# Patient Record
Sex: MALE | Race: WHITE | NOT HISPANIC OR LATINO | Employment: UNEMPLOYED | URBAN - METROPOLITAN AREA
[De-identification: names, ages, dates, MRNs, and addresses within clinical notes are randomized per-mention and may not be internally consistent; named-entity substitution may affect disease eponyms.]

---

## 2017-03-19 ENCOUNTER — APPOINTMENT (OUTPATIENT)
Dept: RADIOLOGY | Facility: MEDICAL CENTER | Age: 37
End: 2017-03-19
Attending: EMERGENCY MEDICINE

## 2017-03-19 ENCOUNTER — HOSPITAL ENCOUNTER (EMERGENCY)
Facility: MEDICAL CENTER | Age: 37
End: 2017-03-19
Attending: EMERGENCY MEDICINE

## 2017-03-19 VITALS
TEMPERATURE: 98.2 F | HEART RATE: 86 BPM | SYSTOLIC BLOOD PRESSURE: 128 MMHG | RESPIRATION RATE: 14 BRPM | HEIGHT: 70 IN | OXYGEN SATURATION: 97 % | WEIGHT: 162.26 LBS | DIASTOLIC BLOOD PRESSURE: 79 MMHG | BODY MASS INDEX: 23.23 KG/M2

## 2017-03-19 DIAGNOSIS — L02.11 NECK ABSCESS: ICD-10-CM

## 2017-03-19 LAB
ANION GAP SERPL CALC-SCNC: 8 MMOL/L (ref 0–11.9)
BASOPHILS # BLD AUTO: 0.3 % (ref 0–1.8)
BASOPHILS # BLD: 0.04 K/UL (ref 0–0.12)
BUN SERPL-MCNC: 16 MG/DL (ref 8–22)
CALCIUM SERPL-MCNC: 9.3 MG/DL (ref 8.5–10.5)
CHLORIDE SERPL-SCNC: 100 MMOL/L (ref 96–112)
CO2 SERPL-SCNC: 26 MMOL/L (ref 20–33)
CREAT SERPL-MCNC: 0.65 MG/DL (ref 0.5–1.4)
EOSINOPHIL # BLD AUTO: 0.25 K/UL (ref 0–0.51)
EOSINOPHIL NFR BLD: 2.1 % (ref 0–6.9)
ERYTHROCYTE [DISTWIDTH] IN BLOOD BY AUTOMATED COUNT: 37.9 FL (ref 35.9–50)
GFR SERPL CREATININE-BSD FRML MDRD: >60 ML/MIN/1.73 M 2
GLUCOSE SERPL-MCNC: 98 MG/DL (ref 65–99)
HCT VFR BLD AUTO: 40.6 % (ref 42–52)
HGB BLD-MCNC: 13.5 G/DL (ref 14–18)
IMM GRANULOCYTES # BLD AUTO: 0.05 K/UL (ref 0–0.11)
IMM GRANULOCYTES NFR BLD AUTO: 0.4 % (ref 0–0.9)
LYMPHOCYTES # BLD AUTO: 1.83 K/UL (ref 1–4.8)
LYMPHOCYTES NFR BLD: 15.5 % (ref 22–41)
MCH RBC QN AUTO: 27.4 PG (ref 27–33)
MCHC RBC AUTO-ENTMCNC: 33.3 G/DL (ref 33.7–35.3)
MCV RBC AUTO: 82.5 FL (ref 81.4–97.8)
MONOCYTES # BLD AUTO: 0.66 K/UL (ref 0–0.85)
MONOCYTES NFR BLD AUTO: 5.6 % (ref 0–13.4)
NEUTROPHILS # BLD AUTO: 9.01 K/UL (ref 1.82–7.42)
NEUTROPHILS NFR BLD: 76.1 % (ref 44–72)
NRBC # BLD AUTO: 0 K/UL
NRBC BLD AUTO-RTO: 0 /100 WBC
PLATELET # BLD AUTO: 266 K/UL (ref 164–446)
PMV BLD AUTO: 8.6 FL (ref 9–12.9)
POTASSIUM SERPL-SCNC: 3.9 MMOL/L (ref 3.6–5.5)
RBC # BLD AUTO: 4.92 M/UL (ref 4.7–6.1)
SODIUM SERPL-SCNC: 134 MMOL/L (ref 135–145)
WBC # BLD AUTO: 11.8 K/UL (ref 4.8–10.8)

## 2017-03-19 PROCEDURE — 99284 EMERGENCY DEPT VISIT MOD MDM: CPT

## 2017-03-19 PROCEDURE — 700101 HCHG RX REV CODE 250

## 2017-03-19 PROCEDURE — A9270 NON-COVERED ITEM OR SERVICE: HCPCS | Performed by: EMERGENCY MEDICINE

## 2017-03-19 PROCEDURE — 87205 SMEAR GRAM STAIN: CPT

## 2017-03-19 PROCEDURE — 700102 HCHG RX REV CODE 250 W/ 637 OVERRIDE(OP): Performed by: EMERGENCY MEDICINE

## 2017-03-19 PROCEDURE — 700111 HCHG RX REV CODE 636 W/ 250 OVERRIDE (IP): Performed by: EMERGENCY MEDICINE

## 2017-03-19 PROCEDURE — 96372 THER/PROPH/DIAG INJ SC/IM: CPT

## 2017-03-19 PROCEDURE — 700101 HCHG RX REV CODE 250: Performed by: EMERGENCY MEDICINE

## 2017-03-19 PROCEDURE — 87077 CULTURE AEROBIC IDENTIFY: CPT

## 2017-03-19 PROCEDURE — 87070 CULTURE OTHR SPECIMN AEROBIC: CPT

## 2017-03-19 PROCEDURE — 303977 HCHG I & D

## 2017-03-19 PROCEDURE — 85025 COMPLETE CBC W/AUTO DIFF WBC: CPT

## 2017-03-19 PROCEDURE — 303485 HCHG DRESSING MEDIUM

## 2017-03-19 PROCEDURE — 80048 BASIC METABOLIC PNL TOTAL CA: CPT

## 2017-03-19 PROCEDURE — 76536 US EXAM OF HEAD AND NECK: CPT

## 2017-03-19 RX ORDER — LIDOCAINE HYDROCHLORIDE 20 MG/ML
20 INJECTION, SOLUTION INFILTRATION; PERINEURAL ONCE
Status: COMPLETED | OUTPATIENT
Start: 2017-03-19 | End: 2017-03-19

## 2017-03-19 RX ORDER — LIDOCAINE HYDROCHLORIDE 10 MG/ML
INJECTION, SOLUTION INFILTRATION; PERINEURAL
Status: COMPLETED
Start: 2017-03-19 | End: 2017-03-19

## 2017-03-19 RX ORDER — ONDANSETRON 2 MG/ML
4 INJECTION INTRAMUSCULAR; INTRAVENOUS ONCE
Status: DISCONTINUED | OUTPATIENT
Start: 2017-03-19 | End: 2017-03-19

## 2017-03-19 RX ORDER — LIDOCAINE HYDROCHLORIDE 10 MG/ML
20 INJECTION, SOLUTION INFILTRATION; PERINEURAL ONCE
Status: COMPLETED | OUTPATIENT
Start: 2017-03-19 | End: 2017-03-19

## 2017-03-19 RX ORDER — ONDANSETRON 4 MG/1
4 TABLET, ORALLY DISINTEGRATING ORAL ONCE
Status: COMPLETED | OUTPATIENT
Start: 2017-03-19 | End: 2017-03-19

## 2017-03-19 RX ORDER — QUETIAPINE FUMARATE 100 MG/1
100 TABLET, FILM COATED ORAL DAILY
COMMUNITY
End: 2018-04-17

## 2017-03-19 RX ORDER — SULFAMETHOXAZOLE AND TRIMETHOPRIM 800; 160 MG/1; MG/1
1 TABLET ORAL ONCE
Status: COMPLETED | OUTPATIENT
Start: 2017-03-19 | End: 2017-03-19

## 2017-03-19 RX ORDER — CEFDINIR 300 MG/1
300 CAPSULE ORAL 2 TIMES DAILY
Qty: 20 CAP | Refills: 0 | Status: SHIPPED | OUTPATIENT
Start: 2017-03-19 | End: 2017-03-29

## 2017-03-19 RX ORDER — SULFAMETHOXAZOLE AND TRIMETHOPRIM 800; 160 MG/1; MG/1
1 TABLET ORAL 2 TIMES DAILY
Qty: 20 TAB | Refills: 0 | Status: SHIPPED | OUTPATIENT
Start: 2017-03-19 | End: 2017-03-29

## 2017-03-19 RX ORDER — HYDROCODONE BITARTRATE AND ACETAMINOPHEN 5; 325 MG/1; MG/1
1-2 TABLET ORAL EVERY 4 HOURS PRN
Qty: 20 TAB | Refills: 0 | Status: SHIPPED | OUTPATIENT
Start: 2017-03-19 | End: 2018-04-17

## 2017-03-19 RX ORDER — MORPHINE SULFATE 4 MG/ML
4 INJECTION, SOLUTION INTRAMUSCULAR; INTRAVENOUS ONCE
Status: DISCONTINUED | OUTPATIENT
Start: 2017-03-19 | End: 2017-03-19

## 2017-03-19 RX ORDER — CEFTRIAXONE 1 G/1
1000 INJECTION, POWDER, FOR SOLUTION INTRAMUSCULAR; INTRAVENOUS ONCE
Status: COMPLETED | OUTPATIENT
Start: 2017-03-19 | End: 2017-03-19

## 2017-03-19 RX ORDER — MORPHINE SULFATE 10 MG/ML
10 INJECTION, SOLUTION INTRAMUSCULAR; INTRAVENOUS ONCE
Status: COMPLETED | OUTPATIENT
Start: 2017-03-19 | End: 2017-03-19

## 2017-03-19 RX ORDER — AMPICILLIN AND SULBACTAM 2; 1 G/1; G/1
3 INJECTION, POWDER, FOR SOLUTION INTRAMUSCULAR; INTRAVENOUS ONCE
Status: DISCONTINUED | OUTPATIENT
Start: 2017-03-19 | End: 2017-03-19

## 2017-03-19 RX ADMIN — SULFAMETHOXAZOLE AND TRIMETHOPRIM 1 TABLET: 800; 160 TABLET ORAL at 22:42

## 2017-03-19 RX ADMIN — LIDOCAINE HYDROCHLORIDE 2.1 ML: 10 INJECTION, SOLUTION INFILTRATION; PERINEURAL at 22:45

## 2017-03-19 RX ADMIN — CEFTRIAXONE 1000 MG: 1 INJECTION, POWDER, FOR SOLUTION INTRAMUSCULAR; INTRAVENOUS at 22:46

## 2017-03-19 RX ADMIN — ONDANSETRON 4 MG: 4 TABLET, ORALLY DISINTEGRATING ORAL at 22:44

## 2017-03-19 RX ADMIN — MORPHINE SULFATE 10 MG: 10 INJECTION INTRAVENOUS at 22:47

## 2017-03-19 RX ADMIN — LIDOCAINE HYDROCHLORIDE 20 ML: 20 INJECTION, SOLUTION INFILTRATION; PERINEURAL at 23:20

## 2017-03-19 ASSESSMENT — PAIN SCALES - GENERAL: PAINLEVEL_OUTOF10: 6

## 2017-03-19 NOTE — ED AVS SNAPSHOT
3/19/2017          Gilson Amin  [address]  [place] [address]    Dear Gilson:    [facility] wants to ensure your discharge home is safe and you or your loved ones have had all your questions answered regarding your care after you leave the hospital.    You may receive a telephone call within two days of your discharge.  This call is to make certain you understand your discharge instructions as well as ensure we provided you with the best care possible during your stay with us.     The call will only last approximately 3-5 minutes and will be done by a nurse.    Once again, we want to ensure your discharge home is safe and that you have a clear understanding of any next steps in your care.  If you have any questions or concerns, please do not hesitate to contact us, we are here for you.  Thank you for choosing [facility] for your healthcare needs.    Sincerely,    Steffen Bell    [facility]

## 2017-03-19 NOTE — ED AVS SNAPSHOT
Home Care Instructions                                                                                                                Gilson Amin   MRN: [id number]    Department:  [facility], Emergency Dept   Date of Visit:  3/19/2017            [facility], Emergency Dept    [address] [place] [id number]    Phone:  [phone]      You were seen by     Ximena Austin M.D.      Your Diagnosis Was     Neck abscess     L02.11       These are the medications you received during your hospitalization from 03/19/2017 2108 to 03/19/2017 2340     Date/Time Order Dose Route Action    03/19/2017 2242 sulfamethoxazole-trimethoprim (BACTRIM DS) 800-160 MG tablet 1 Tab 1 Tab Oral Given    03/19/2017 2246 cefTRIAXone (ROCEPHIN) injection 1,000 mg 1,000 mg Intramuscular Given    03/19/2017 2247 morphine (pf) 10 mg/ml injection 10 mg 10 mg Intramuscular Given    03/19/2017 2244 ondansetron (ZOFRAN ODT) dispertab 4 mg 4 mg Oral Given    03/19/2017 2245 lidocaine (XYLOCAINE) 1 % injection 2.1 mL Other Given    03/19/2017 2320 lidocaine (XYLOCAINE) 2 % injection 20 mL 20 mL Other Given      Follow-up Information     1. Follow up with [facility], Emergency Dept In 2 days.    Specialty:  Emergency Medicine    Why:  For wound re-check    Contact information    [address] [place] [id number] [phone]      Medication Information     Review all of your home medications and newly ordered medications with your primary doctor and/or pharmacist as soon as possible. Follow medication instructions as directed by your doctor and/or pharmacist.     Please keep your complete medication list with you and share with your physician. Update the information when medications are discontinued, doses are changed, or new medications (including over-the-counter products) are added; and carry medication information at all times in the event of emergency situations.                  Medication List      START taking these medications        Instructions    Morning Afternoon Evening Bedtime    cefdinir 300 MG Caps   Commonly known as:  OMNICEF        Take 1 Cap by mouth 2 times a day for 10 days.   Dose:  300 mg                        hydrocodone-acetaminophen 5-325 MG Tabs per tablet   Commonly known as:  NORCO        Take 1-2 Tabs by mouth every four hours as needed.   Dose:  1-2 Tab                        sulfamethoxazole-trimethoprim 800-160 MG tablet   Last time this was given:  1 Tab on 3/19/2017 10:42 PM   Commonly known as:  BACTRIM DS        Take 1 Tab by mouth 2 times a day for 10 days.   Dose:  1 Tab                          ASK your doctor about these medications        Instructions    Morning Afternoon Evening Bedtime    quetiapine 100 MG Tabs   Commonly known as:  SEROQUEL        Take 100 mg by mouth every day.   Dose:  100 mg                             Where to Get Your Medications      You can get these medications from any pharmacy     Bring a paper prescription for each of these medications    - cefdinir 300 MG Caps  - hydrocodone-acetaminophen 5-325 MG Tabs per tablet  - sulfamethoxazole-trimethoprim 800-160 MG tablet            Procedures and tests performed during your visit     BASIC METABOLIC PANEL    CBC WITH DIFFERENTIAL    US-SOFT TISSUES OF HEAD - NECK        Discharge Instructions       Abscess  An abscess (boil or furuncle) is an infected area on or under the skin. This area is filled with yellowish-white fluid (pus) and other material (debris).  HOME CARE   · Only take medicines as told by your doctor.  · If you were given antibiotic medicine, take it as directed. Finish the medicine even if you start to feel better.  · If gauze is used, follow your doctor's directions for changing the gauze.  · To avoid spreading the infection:  ¨ Keep your abscess covered with a bandage.  ¨ Wash your hands well.  ¨ Do not share personal care items, towels, or whirlpools with  others.  ¨ Avoid skin contact with others.  · Keep your skin and clothes clean around the abscess.  · Keep all doctor visits as told.  GET HELP RIGHT AWAY IF:   · You have more pain, puffiness (swelling), or redness in the wound site.  · You have more fluid or blood coming from the wound site.  · You have muscle aches, chills, or you feel sick.  · You have a fever.  MAKE SURE YOU:   · Understand these instructions.  · Will watch your condition.  · Will get help right away if you are not doing well or get worse.     This information is not intended to replace advice given to you by your health care provider. Make sure you discuss any questions you have with your health care provider.     Document Released: 06/05/2009 Document Revised: 06/18/2013 Document Reviewed: 03/02/2013  [employer] Interactive Patient Education ©2016 [employer]    Abscess  An abscess (boil or furuncle) is an infected area on or under the skin. This area is filled with yellowish-white fluid (pus) and other material (debris).  HOME CARE   · Only take medicines as told by your doctor.  · If you were given antibiotic medicine, take it as directed. Finish the medicine even if you start to feel better.  · If gauze is used, follow your doctor's directions for changing the gauze.  · To avoid spreading the infection:  ¨ Keep your abscess covered with a bandage.  ¨ Wash your hands well.  ¨ Do not share personal care items, towels, or whirlpools with others.  ¨ Avoid skin contact with others.  · Keep your skin and clothes clean around the abscess.  · Keep all doctor visits as told.  GET HELP RIGHT AWAY IF:   · You have more pain, puffiness (swelling), or redness in the wound site.  · You have more fluid or blood coming from the wound site.  · You have muscle aches, chills, or you feel sick.  · You have a fever.  MAKE SURE YOU:   · Understand these instructions.  · Will watch your condition.  · Will get help right away if you are not doing well or get  worse.     This information is not intended to replace advice given to you by your health care provider. Make sure you discuss any questions you have with your health care provider.     Document Released: 06/05/2009 Document Revised: 06/18/2013 Document Reviewed: 03/02/2013  Elsevier Interactive Patient Education ©2016 [employer]            Patient Information     Patient Information    Following emergency treatment: all patient requiring follow-up care must return either to a private physician or a clinic if your condition worsens before you are able to obtain further medical attention, please return to the emergency room.     Billing Information    At [facility], we work to make the billing process streamlined for our patients.  Our Representatives are here to answer any questions you may have regarding your hospital bill.  If you have insurance coverage and have supplied your insurance information to us, we will submit a claim to your insurer on your behalf.  Should you have any questions regarding your bill, we can be reached online or by phone as follows:  Online: You are able pay your bills online or live chat with our representatives about any billing questions you may have. We are here to help Monday - Friday from 8:00am to 7:30pm and 9:00am - 12:00pm on Saturdays.  Please visit [email or web address]  for more information.   Phone:  [phone] or [phone]    Please note that your emergency physician, surgeon, pathologist, radiologist, anesthesiologist, and other specialists are not employed by [facility] and will therefore bill separately for their services.  Please contact them directly for any questions concerning their bills at the numbers below:     Emergency Physician Services:  [phone]  [facility]:  [phone]  [facility]:  [phone]  [facility]:  [phone]    1. Your final bill may vary from the  amount quoted upon discharge if all procedures are not complete at that time, or if your doctor has additional procedures of which we are not aware. You will receive an additional bill if you return to the Emergency Department at [facility] for suture removal regardless of the facility of which the sutures were placed.     2. Please arrange for settlement of this account at the emergency registration.    3. All self-pay accounts are due in full at the time of treatment.  If you are unable to meet this obligation then payment is expected within 4-5 days.     4. If you have had radiology studies (CT, X-ray, Ultrasound, MRI), you have received a preliminary result during your emergency department visit. Please contact the radiology department [phone] to receive a copy of your final result. Please discuss the Final result with your primary physician or with the follow up physician provided.     Crisis Hotline:  [place] Crisis Hotline:  4-778-ONTEIKB or 1-116.980.3689  [place] Crisis Hotline:    1-516.721.2965 or 584-756-4871         ED Discharge Follow Up Questions    1. In order to provide you with very good care, we would like to follow up with a phone call in the next few days.  May we have your permission to contact you?     YES /  NO    2. What is the best phone number to call you? (       )_____-__________    3. What is the best time to call you?      Morning  /  Afternoon  /  Evening                   Patient Signature:  ____________________________________________________________    Date:  ____________________________________________________________

## 2017-03-20 LAB
GRAM STN SPEC: NORMAL
SIGNIFICANT IND 70042: NORMAL
SITE SITE: NORMAL
SOURCE SOURCE: NORMAL

## 2017-03-20 NOTE — ED NOTES
Assessment made. Chart up for MD to see. Patient c/o right side of neck redness and swelling noted. Possible insect/ spider bite.

## 2017-03-20 NOTE — ED PROVIDER NOTES
"ED Provider Note    Scribed for Ximena Austin M.D. by Omar Washington. 3/19/2017  9:46 PM    Primary care provider: Pcp Pt States None  Means of arrival: walk-in  History obtained from: sarah   History limited by: none    CHIEF COMPLAINT  Chief Complaint   Patient presents with   • Neck Swelling     patient reports lump to right side of neck with onset yesterday. swelling and redness noted to right side of neck.        HPI  Gilson Amin is a 37 y.o. male who presents to the Emergency Department for evaluation of right-sided neck swelling, onset a few days ago. At that time he noted a \"lump\" in his neck. Patient denies any falls or trauma. Patient has associated swelling and erythema to his neck. He adds that he has no history of diabetes mellitus or drug use.      REVIEW OF SYSTEMS  HEENT:  Neck pain.    SKIN:  Swelling and erythema in neck region.    See history of present illness. All other systems are negative. C.       PAST MEDICAL HISTORY   has a past medical history of Anxiety.    SURGICAL HISTORY   has past surgical history that includes other orthopedic surgery; other orthopedic surgery; and other orthopedic surgery.    SOCIAL HISTORY  Social History   Substance Use Topics   • Smoking status: Current Every Day Smoker -- 0.50 packs/day for 10 years     Types: Cigarettes   • Smokeless tobacco: None   • Alcohol Use: Yes      Comment: occ      History   Drug Use   • Yes   • Special: Inhaled     Comment: teaaine       FAMILY HISTORY  History reviewed. No pertinent family history.    CURRENT MEDICATIONS  Home Medications     Reviewed by Stephania Moore R.N. (Registered Nurse) on 03/19/17 at 2115  Med List Status: Complete    Medication Last Dose Status    quetiapine (SEROQUEL) 100 MG Tab 3/18/2017 Active                ALLERGIES  No Known Allergies    PHYSICAL EXAM  VITAL SIGNS: /74 mmHg  Pulse 98  Temp(Src) 36.8 °C (98.2 °F)  Resp 18  Ht 1.778 m (5' 10\")  Wt 73.6 kg (162 lb 4.1 oz)  BMI 23.28 " kg/m2  SpO2 98%    Constitutional: Well developed, Well nourished, No acute distress, Non-toxic appearance.   HEENT:  Normocephalic, Atraumatic, external ears normal, pharynx pink,  Mucous  Membranes moist, No rhinorrhea or mucosal edema.   Eyes: PERRL, EOMI, Conjunctiva normal, No discharge.   Neck: Large,5cm by 6cm fluctuant, erythematous lump in neck. Warm and tender to touch. No decreased range of motion. No bite marks or scratches. Supple, No stridor.   Lymphatic: No lymphadenopathy    Cardiovascular: Regular Rate and Rhythm, No murmurs,  rubs, or gallops.   Thorax & Lungs: Lungs clear to auscultation bilaterally, No respiratory distress, No wheezes, rhales or rhonchi, No chest wall tenderness.   Abdomen: Bowel sounds normal, Soft, non tender, non distended,  No pulsatile masses., no rebound guarding or peritoneal signs.   Skin: Warm, Dry. Erythema as described above   Back:  No CVA tenderness,  No spinal tenderness, bony crepitance, step offs, or instability.   Neurologic: Alert & oriented x 3, Normal motor function, Normal sensory function,   Extremities: Equal, intact distal pulses, No cyanosis, clubbing or edema,  No tenderness. No arm swelling  Musculoskeletal: Good range of motion in all major joints. No tenderness to palpation or major deformities noted.       DIAGNOSTIC STUDIES / PROCEDURES  Incision and Drainage Procedure Note    Indication: Abscess    Procedure: The patient was positioned appropriately and the skin over the incision site was prepped with betadine and draped in a sterile fashion. Local anesthesia was obtained by infiltration using 1% Lidocaine without epinephrine.  An incision was then made over the center of the lesion and approximately 7 cc of thick, purulent and yellow material was expressed. Loculations were broken up using forceps and more of the material was able to be expressed. The drainage cavity was then irrigated, packed with sterile gauze and dressed with a sterile  dressing. The patient’s tetanus status was up to date and did not require a booster dose.    The patient tolerated the procedure well.    Complications: None      LABS  Results for orders placed or performed during the hospital encounter of 03/19/17   CBC WITH DIFFERENTIAL   Result Value Ref Range    WBC 11.8 (H) 4.8 - 10.8 K/uL    RBC 4.92 4.70 - 6.10 M/uL    Hemoglobin 13.5 (L) 14.0 - 18.0 g/dL    Hematocrit 40.6 (L) 42.0 - 52.0 %    MCV 82.5 81.4 - 97.8 fL    MCH 27.4 27.0 - 33.0 pg    MCHC 33.3 (L) 33.7 - 35.3 g/dL    RDW 37.9 35.9 - 50.0 fL    Platelet Count 266 164 - 446 K/uL    MPV 8.6 (L) 9.0 - 12.9 fL    Neutrophils-Polys 76.10 (H) 44.00 - 72.00 %    Lymphocytes 15.50 (L) 22.00 - 41.00 %    Monocytes 5.60 0.00 - 13.40 %    Eosinophils 2.10 0.00 - 6.90 %    Basophils 0.30 0.00 - 1.80 %    Immature Granulocytes 0.40 0.00 - 0.90 %    Nucleated RBC 0.00 /100 WBC    Neutrophils (Absolute) 9.01 (H) 1.82 - 7.42 K/uL    Lymphs (Absolute) 1.83 1.00 - 4.80 K/uL    Monos (Absolute) 0.66 0.00 - 0.85 K/uL    Eos (Absolute) 0.25 0.00 - 0.51 K/uL    Baso (Absolute) 0.04 0.00 - 0.12 K/uL    Immature Granulocytes (abs) 0.05 0.00 - 0.11 K/uL    NRBC (Absolute) 0.00 K/uL   BASIC METABOLIC PANEL   Result Value Ref Range    Sodium 134 (L) 135 - 145 mmol/L    Potassium 3.9 3.6 - 5.5 mmol/L    Chloride 100 96 - 112 mmol/L    Co2 26 20 - 33 mmol/L    Glucose 98 65 - 99 mg/dL    Bun 16 8 - 22 mg/dL    Creatinine 0.65 0.50 - 1.40 mg/dL    Calcium 9.3 8.5 - 10.5 mg/dL    Anion Gap 8.0 0.0 - 11.9   ESTIMATED GFR   Result Value Ref Range    GFR If African American >60 >60 mL/min/1.73 m 2    GFR If Non African American >60 >60 mL/min/1.73 m 2   All labs reviewed by me.    RADIOLOGY  US-SOFT TISSUES OF HEAD - NECK   Final Result      Irregular fluid collection containing debris in the right neck, possibly representing a phlegmon/early abscess        The radiologist's interpretation of all radiological studies have been reviewed by  me.    COURSE & MEDICAL DECISION MAKING  Nursing notes, VS, PMSFHx reviewed in chart.    9:46 PM - Patient seen and examined at bedside. Patient will be treated with Unasyn 3g, Bactrim 800-160 mg, Morphine 4 mg, Zofran 4 mg. Ordered US chest, CBC with differential, CMP, Saline lock to evaluate his symptoms. The differential diagnoses include but are not limited to: cyst, abscess    10:33 PM Patient reevaluated at bedside. Patient is resting. Discussed treating patient with IM medication as staff is unable to place an IV, secodanry to multiple track marks on his extremities.     11:30pm . The patient tolerated the procedure well. Will be discharged home on oral antibiotics and pain medication. I advised him to return in 2 days for a recheck of abscess. He is to return immediately for fevers, dizziness, worsening pain or worsening symptoms.  score was 20    [facility], Emergency Dept  [address] [facility] [address] [phone]  In 2 days  For wound re-check    Current Outpatient Prescriptions   Medication Sig Dispense Refill   • quetiapine (SEROQUEL) 100 MG Tab Take 100 mg by mouth every day.     • sulfamethoxazole-trimethoprim (BACTRIM DS) 800-160 MG tablet Take 1 Tab by mouth 2 times a day for 10 days. 20 Tab 0   • cefdinir (OMNICEF) 300 MG Cap Take 1 Cap by mouth 2 times a day for 10 days. 20 Cap 0   • hydrocodone-acetaminophen (NORCO) 5-325 MG Tab per tablet Take 1-2 Tabs by mouth every four hours as needed. 20 Tab 0         FINAL IMPRESSION  1. Neck abscess          Omar MARQUEZ (Wendiibmichelle), am scribing for, and in the presence of, Ximena Austin M.D..    Electronically signed by: Omar Washington (Nelida), 3/19/2017    Ximena MARQUEZ M.D. personally performed the services described in this documentation, as scribed by Omar Washington in my presence, and it is both accurate and complete.    The note accurately reflects work and decisions made by me.  Ximena Austin   3/19/2017  11:58 PM

## 2017-03-20 NOTE — ED NOTES
Wound culture sent to lab. Wound dressed and patient given supplies for home dressing change. Verbalizes understanding of discharge and followup instructions. VSS. Given Rx's x3. Ambulates with steady gait to discharge.

## 2017-03-20 NOTE — ED NOTES
".Gilson Amin  .  Chief Complaint   Patient presents with   • Neck Swelling     patient reports lump to right side of neck with onset yesterday. swelling and redness noted to right side of neck.      Patient to triage with above complaint. Patient speaking in full sentences and maintaining secretions. ./74 mmHg  Pulse 98  Temp(Src) 36.8 °C (98.2 °F)  Resp 18  Ht 1.778 m (5' 10\")  Wt 73.6 kg (162 lb 4.1 oz)  BMI 23.28 kg/m2  SpO2 98%    Patient to lobby and instructed to inform staff of any needs.  "

## 2017-03-20 NOTE — DISCHARGE INSTRUCTIONS
Abscess  An abscess (boil or furuncle) is an infected area on or under the skin. This area is filled with yellowish-white fluid (pus) and other material (debris).  HOME CARE   · Only take medicines as told by your doctor.  · If you were given antibiotic medicine, take it as directed. Finish the medicine even if you start to feel better.  · If gauze is used, follow your doctor's directions for changing the gauze.  · To avoid spreading the infection:  ¨ Keep your abscess covered with a bandage.  ¨ Wash your hands well.  ¨ Do not share personal care items, towels, or whirlpools with others.  ¨ Avoid skin contact with others.  · Keep your skin and clothes clean around the abscess.  · Keep all doctor visits as told.  GET HELP RIGHT AWAY IF:   · You have more pain, puffiness (swelling), or redness in the wound site.  · You have more fluid or blood coming from the wound site.  · You have muscle aches, chills, or you feel sick.  · You have a fever.  MAKE SURE YOU:   · Understand these instructions.  · Will watch your condition.  · Will get help right away if you are not doing well or get worse.     This information is not intended to replace advice given to you by your health care provider. Make sure you discuss any questions you have with your health care provider.     Document Released: 06/05/2009 Document Revised: 06/18/2013 Document Reviewed: 03/02/2013  [employer] Interactive Patient Education ©2016 [employer]    Abscess  An abscess (boil or furuncle) is an infected area on or under the skin. This area is filled with yellowish-white fluid (pus) and other material (debris).  HOME CARE   · Only take medicines as told by your doctor.  · If you were given antibiotic medicine, take it as directed. Finish the medicine even if you start to feel better.  · If gauze is used, follow your doctor's directions for changing the gauze.  · To avoid spreading the infection:  ¨ Keep your abscess covered with a bandage.  ¨ Wash your  hands well.  ¨ Do not share personal care items, towels, or whirlpools with others.  ¨ Avoid skin contact with others.  · Keep your skin and clothes clean around the abscess.  · Keep all doctor visits as told.  GET HELP RIGHT AWAY IF:   · You have more pain, puffiness (swelling), or redness in the wound site.  · You have more fluid or blood coming from the wound site.  · You have muscle aches, chills, or you feel sick.  · You have a fever.  MAKE SURE YOU:   · Understand these instructions.  · Will watch your condition.  · Will get help right away if you are not doing well or get worse.     This information is not intended to replace advice given to you by your health care provider. Make sure you discuss any questions you have with your health care provider.     Document Released: 06/05/2009 Document Revised: 06/18/2013 Document Reviewed: 03/02/2013  [employer] Interactive Patient Education ©2016 [employer]

## 2017-03-22 LAB
BACTERIA WND AEROBE CULT: ABNORMAL
GRAM STN SPEC: ABNORMAL
SIGNIFICANT IND 70042: ABNORMAL
SITE SITE: ABNORMAL
SOURCE SOURCE: ABNORMAL

## 2018-04-17 ENCOUNTER — OFFICE VISIT (OUTPATIENT)
Dept: MEDICAL GROUP | Facility: MEDICAL CENTER | Age: 38
End: 2018-04-17
Attending: NURSE PRACTITIONER
Payer: MEDICAID

## 2018-04-17 VITALS
HEIGHT: 70 IN | RESPIRATION RATE: 19 BRPM | TEMPERATURE: 99.3 F | OXYGEN SATURATION: 98 % | DIASTOLIC BLOOD PRESSURE: 76 MMHG | SYSTOLIC BLOOD PRESSURE: 128 MMHG | HEART RATE: 72 BPM | WEIGHT: 170.2 LBS | BODY MASS INDEX: 24.37 KG/M2

## 2018-04-17 DIAGNOSIS — Z13.29 SCREENING FOR THYROID DISORDER: ICD-10-CM

## 2018-04-17 DIAGNOSIS — F99 PSYCHIATRIC DISORDER: ICD-10-CM

## 2018-04-17 DIAGNOSIS — Z13.21 ENCOUNTER FOR VITAMIN DEFICIENCY SCREENING: ICD-10-CM

## 2018-04-17 DIAGNOSIS — F19.10 SUBSTANCE ABUSE (HCC): ICD-10-CM

## 2018-04-17 DIAGNOSIS — Z11.59 NEED FOR HEPATITIS C SCREENING TEST: ICD-10-CM

## 2018-04-17 PROCEDURE — 99204 OFFICE O/P NEW MOD 45 MIN: CPT | Performed by: NURSE PRACTITIONER

## 2018-04-17 PROCEDURE — 99212 OFFICE O/P EST SF 10 MIN: CPT | Performed by: NURSE PRACTITIONER

## 2018-04-17 RX ORDER — MIRTAZAPINE 30 MG/1
TABLET, FILM COATED ORAL
COMMUNITY
Start: 2018-04-06 | End: 2018-04-17 | Stop reason: SDUPTHER

## 2018-04-17 RX ORDER — NALTREXONE HYDROCHLORIDE 50 MG/1
TABLET, FILM COATED ORAL
COMMUNITY
Start: 2018-04-06

## 2018-04-17 RX ORDER — BUSPIRONE HYDROCHLORIDE 10 MG/1
10 TABLET ORAL 2 TIMES DAILY
Qty: 60 TAB | Refills: 1 | Status: SHIPPED | OUTPATIENT
Start: 2018-04-17

## 2018-04-17 RX ORDER — MIRTAZAPINE 30 MG/1
30 TABLET, FILM COATED ORAL
Qty: 30 TAB | Refills: 1 | Status: SHIPPED | OUTPATIENT
Start: 2018-04-17

## 2018-04-17 ASSESSMENT — PATIENT HEALTH QUESTIONNAIRE - PHQ9: CLINICAL INTERPRETATION OF PHQ2 SCORE: 0

## 2018-04-17 NOTE — PROGRESS NOTES
"Chief Complaint:   Chief Complaint   Patient presents with   • Annual Exam   • Anxiety       HPI:  Gilson is here today to establish as a new patient.     [place]  Report:   3/6/18 Xanax 1 mg # 28 by Mary Huff ([facility])  ------------------------------------------------------------------    His PMH includes:    Psychiatric Disorder- Anxiety and Depression hx  Substance Abuse- Cocaine, Inhaled drug use  Hand surgery  Bilat Ankle Surgery  Bilat Knee Surgery  Tobacco Abuse- smoking  Neck Abscess  Tobacco Use- Smoking    Review of Records:  3/19/17 neck abscess C and S pos for B-Strep Group F, STrep Viridins, H,parainfluenza  3/19/18 ER for Neck swelling/Abscess, CBC and CMP normal, I and D performed and C& S.  Unable to start IV due to \"track marks\" and poor veins, TX w Bactrim DS, [place].        Psychiatric disorder  Pt records show RX for Seroquel and Remeron.  Pt reports has has anxiety and self medicated by drinking and then \"found heroin\".  Now clean, 6 months in correction and now at [facility].    Was seeing Dr Aaron for Remeron, Naltrexon.  Asking for Xanax- Denied by me. Discussed not the best medicine for   People with addictive tendencies.   Recommend trial of Buspar, for him to continue Remeron at hs  And to f/u Psychiatry and Counseling.  Pt denies depression or suicidal ideation.    Substance abuse  Has been in [facility] as Outpatient but now going into fulltime program for Heroin IVDU Rehab. Had issues w alcohol in past but not for some time. Also some cocaine use in past but not for some time.  Reports no heroin for 6 months as was in nursing home and going to   [facility] out pt. Will be entering [facility] on full time basis today.      Patient Active Problem List    Diagnosis Date Noted   • Psychiatric disorder 04/17/2018   • Substance abuse 04/17/2018       Allergies:Patient has no known allergies.    Current Outpatient Prescriptions   Medication Sig Dispense Refill   • busPIRone (BUSPAR) 10 MG Tab Take 1 " "Tab by mouth 2 times a day. 60 Tab 1   • mirtazapine (REMERON) 30 MG Tab tablet Take 1 Tab by mouth every bedtime. 30 Tab 1   • naltrexone (DEPADE) 50 MG Tab        No current facility-administered medications for this visit.        Social History   Substance Use Topics   • Smoking status: Current Every Day Smoker     Packs/day: 0.50     Years: 10.00     Types: Cigarettes   • Smokeless tobacco: Never Used   • Alcohol use No      Comment: occ       Past Medical History:   Diagnosis Date   • Anxiety        Family History   Problem Relation Age of Onset   • Cancer Father        ROS:  Review of Systems   See HPI Above    Exam:  Blood pressure 128/76, pulse 72, temperature 37.4 °C (99.3 °F), resp. rate 19, height 1.778 m (5' 10\"), weight 77.2 kg (170 lb 3.2 oz), SpO2 98 %. Body mass index is 24.42 kg/m².    General:  Well nourished, well developed male in NAD  HENT:Head is grossly normal. PERRL. Tattoos to face, arms.  Neck: Supple. Trachea is midline.  Pulmonary: Clear to ausculation .  Normal effort. No rales, ronchi, or wheezing.   Cardiovascular: Regular rate and rhythm.  Abdomen-Abdomen is soft, No tenderness.  Upper extremities- Strong = . Good ROM  Lower extremities- neg for edema, redness, tenderness.  Neuro- A & O x 4. Speech clear and appropriate.    Current medications, allergies, and problem list reviewed with patient and updated in EPIC today.    Assessment/Plan:  1. Encounter for vitamin deficiency screening  VITAMIN B12    VITAMIN D,25 HYDROXY   2. Screening for thyroid disorder  TSH   3. Need for hepatitis C screening test  HEPATITIS PANEL ACUTE(4 COMPONENTS)    HIV AG/AB COMBO ASSAY DIAGNOSTIC   4. Psychiatric disorder  REFERRAL TO PSYCHIATRY    REFERRAL TO PSYCHOLOGY    busPIRone (BUSPAR) 10 MG Tab BID    mirtazapine (REMERON) 30 MG Tab tablet   5. Substance abuse  Continue w [facility] program.   Avoid drug or alcohol use.       Return in about 4 weeks (around 5/15/2018) for lab results.  "

## 2018-04-17 NOTE — ASSESSMENT & PLAN NOTE
"Pt records show RX for Seroquel and Remeron.  Pt reports has has anxiety and self medicated by drinking and then \"found heroin\".  Now clean, 6 months in alf and now at [facility].    Was seeing Dr Aaron for Remeron, Naltrexon.  Asking for Xanax. Discussed not the best medicine for   People with addictive tendencies.   Recommend trial of Buspar, for him to continue Remeron at hs  And to f/u Psychiatry and Counseling.  Pt denies depression or suicidal ideation.  "

## 2018-04-17 NOTE — ASSESSMENT & PLAN NOTE
Has been in [facility] as Outpatient but now going into fulltime program for Heroin IVDU Rehab. Had issues w alcohol in past but not for some time. Also some cocaine use in past but not for some time.  Reports no heroin for 6 months as was in correction and going to   [facility] out pt. Will be entering [facility] on full time basis today.

## 2020-05-10 ENCOUNTER — HOSPITAL ENCOUNTER (EMERGENCY)
Facility: MEDICAL CENTER | Age: 40
End: 2020-05-10
Attending: EMERGENCY MEDICINE
Payer: COMMERCIAL

## 2020-05-10 VITALS
WEIGHT: 170.19 LBS | OXYGEN SATURATION: 96 % | BODY MASS INDEX: 24.37 KG/M2 | HEART RATE: 103 BPM | HEIGHT: 70 IN | SYSTOLIC BLOOD PRESSURE: 126 MMHG | TEMPERATURE: 97.7 F | DIASTOLIC BLOOD PRESSURE: 96 MMHG | RESPIRATION RATE: 18 BRPM

## 2020-05-10 DIAGNOSIS — L02.91 ABSCESS: ICD-10-CM

## 2020-05-10 DIAGNOSIS — M54.2 NECK PAIN: ICD-10-CM

## 2020-05-10 PROCEDURE — 303485 HCHG DRESSING MEDIUM

## 2020-05-10 PROCEDURE — 700102 HCHG RX REV CODE 250 W/ 637 OVERRIDE(OP): Performed by: EMERGENCY MEDICINE

## 2020-05-10 PROCEDURE — 99283 EMERGENCY DEPT VISIT LOW MDM: CPT

## 2020-05-10 PROCEDURE — 303977 HCHG I & D

## 2020-05-10 PROCEDURE — A9270 NON-COVERED ITEM OR SERVICE: HCPCS | Performed by: EMERGENCY MEDICINE

## 2020-05-10 PROCEDURE — 700101 HCHG RX REV CODE 250: Performed by: EMERGENCY MEDICINE

## 2020-05-10 RX ORDER — IBUPROFEN 600 MG/1
600 TABLET ORAL ONCE
Status: COMPLETED | OUTPATIENT
Start: 2020-05-10 | End: 2020-05-10

## 2020-05-10 RX ORDER — BUPIVACAINE HYDROCHLORIDE 5 MG/ML
10 INJECTION, SOLUTION EPIDURAL; INTRACAUDAL ONCE
Status: COMPLETED | OUTPATIENT
Start: 2020-05-10 | End: 2020-05-10

## 2020-05-10 RX ADMIN — IBUPROFEN 600 MG: 600 TABLET ORAL at 15:48

## 2020-05-10 RX ADMIN — BUPIVACAINE HYDROCHLORIDE 10 ML: 5 INJECTION, SOLUTION EPIDURAL; INTRACAUDAL; PERINEURAL at 15:48

## 2020-05-10 NOTE — ED NOTES
Pt given water to drink for PO challenge. Pt tolerating well. Denies any n/v at this time. Pt laying in bed. No signs of acute distress. Respirations even and unlabored. Police at bedside. MD at bedside.

## 2020-05-10 NOTE — ED TRIAGE NOTES
Pt brought in by police. Pt ran from police and was tackled. Pt states officer's knee went into his neck and pt now has pain in neck. Pt denies any numbness or tingling in extremities. Has steady gait. Pt also states noticed a possible abscess to his upper left forearm. Pt states noticed it two days ago and getting progressively worse.

## 2020-05-10 NOTE — ED PROVIDER NOTES
"ED Provider Note    CHIEF COMPLAINT  Chief Complaint   Patient presents with   • Neck Pain   • Abscess     left upper forearm       HPI  Gilson Amin is a 40 y.o. male who presents with a report that he was running from police today and he is concerned that he is got some left-sided neck pain and also states that he used heroin last week and then developed an abscess in his left forearm proximally and was hoping that we might be able to drain it.  He says that he is been feeling feverish but arrives afebrile with a pulse of 124 but this was just after running.  He denies any other complaints and is post to be on naltrexone    REVIEW OF SYSTEMS  See HPI for further details. All other systems are negative.     PAST MEDICAL HISTORY  Past Medical History:   Diagnosis Date   • Anxiety        FAMILY HISTORY  Family History   Problem Relation Age of Onset   • Cancer Father        SOCIAL HISTORY   reports that he has been smoking cigarettes. He has a 5.00 pack-year smoking history. He has never used smokeless tobacco. He reports current drug use. Drug: Inhaled. He reports that he does not drink alcohol.    SURGICAL HISTORY  Past Surgical History:   Procedure Laterality Date   • OTHER ORTHOPEDIC SURGERY      r hand   • OTHER ORTHOPEDIC SURGERY      bilateral ankle   • OTHER ORTHOPEDIC SURGERY      bilateral knee sx       CURRENT MEDICATIONS  Home Medications    **Home medications have not yet been reviewed for this encounter**         ALLERGIES  No Known Allergies    PHYSICAL EXAM  VITAL SIGNS: /96 Comment: Simultaneous filing. User may not have seen previous data.  Pulse (!) 124 Comment: Simultaneous filing. User may not have seen previous data.  Temp 36.5 °C (97.7 °F) (Temporal)   Resp 18   Ht 1.778 m (5' 10\")   Wt 77.2 kg (170 lb 3.1 oz)   SpO2 96% Comment: Simultaneous filing. User may not have seen previous data.  BMI 24.42 kg/m²    Constitutional: Well developed, Well nourished, No acute distress, " Non-toxic appearance.   HENT: Normocephalic, Atraumatic, Bilateral external ears normal, Oropharynx is clear mucous membranes are moist. No oral exudates or nasal discharge.   Eyes: Pupils are equal round and reactive, EOMI, Conjunctiva normal, No discharge.   Neck: Normal range of motion, left-sided upper posterior neck but no significant midline tenderness, Supple, No stridor. No meningismus.  Lymphatic: No lymphadenopathy noted.   Cardiovascular: Tachycardic rate and rhythm without murmur rub or gallop.  Thorax & Lungs: Clear breath sounds bilaterally without wheezes, rhonchi or rales. There is no chest wall tenderness.   Abdomen: Soft non-tender non-distended. There is no rebound or guarding. No organomegaly is appreciated. Bowel sounds are normal.  Skin: Diaphoresis without rash.   Back: No CVA or spinal tenderness.   Extremities: Intact distal pulses, No edema, proximal left forearm tenderness where he has an abscess is approximately the size of a walnut in the left upper forearm, No cyanosis, No clubbing. Capillary refill is less than 2 seconds.  Musculoskeletal: Good range of motion in all major joints. No tenderness to palpation or major deformities noted.   Neurologic: Alert & oriented x 3, Normal motor function, Normal sensory function, No focal deficits noted. Reflexes are normal.  Psychiatric: Affect normal, Judgment normal, Mood normal. There is no suicidal ideation or patient reported hallucinations.     Incision and Drainage Procedure Note    Indication: Abscess    Procedure: The patient was positioned appropriately and the skin over the incision site was prepped with betadine and draped in a sterile fashion. Local anesthesia was obtained by infiltration using 0.5% Bupivacaine without epinephrine.  An incision was then made over the apex of the lesion and approximately 6 cc of foul smelling material was expressed. Loculations were broken up using forceps and more of the material was able to be  expressed. The drainage cavity was then dressed with gauze. The patient’s tetanus status was up to date and did not require a booster dose.    The patient tolerated the procedure well.    Complications: bleeding      COURSE & MEDICAL DECISION MAKING  Pertinent Labs & Imaging studies reviewed. (See chart for details)  Patient seems to have some tenderness about the left neck that I believe is from strain and contusion from being arrested.  I do not think there is an indication for imaging for fracture as he has no midline point tenderness of the bones    There is no work-up in terms of lab work required and he does have a good sized abscess of the left upper forearm and I recommended incision and drainage with the paper patient verbally agreed to and this was done as follows:    The patient was incised and drained as above with significant pustular material removed.  This was a fairly complex abscess but no surrounding cellulitis that would suggest need for oral outpatient antibiotics.    Patient is discharged in stable condition will return if any significant change in symptoms and he is in police custody    FINAL IMPRESSION  1. Abscess    2. Neck pain    Incision and drainage of complex abscess by ERP         Electronically signed by: Eliseo Benitez M.D., 5/10/2020 4:08 PM

## 2021-09-19 ENCOUNTER — HOSPITAL ENCOUNTER (EMERGENCY)
Facility: MEDICAL CENTER | Age: 41
End: 2021-09-19
Attending: EMERGENCY MEDICINE
Payer: MEDICAID

## 2021-09-19 ENCOUNTER — APPOINTMENT (OUTPATIENT)
Dept: RADIOLOGY | Facility: MEDICAL CENTER | Age: 41
End: 2021-09-19
Attending: EMERGENCY MEDICINE
Payer: MEDICAID

## 2021-09-19 VITALS
SYSTOLIC BLOOD PRESSURE: 140 MMHG | RESPIRATION RATE: 16 BRPM | DIASTOLIC BLOOD PRESSURE: 86 MMHG | OXYGEN SATURATION: 95 % | HEIGHT: 70 IN | HEART RATE: 86 BPM | WEIGHT: 157.19 LBS | BODY MASS INDEX: 22.5 KG/M2 | TEMPERATURE: 97.3 F

## 2021-09-19 LAB — EKG IMPRESSION: NORMAL

## 2021-09-19 PROCEDURE — 71045 X-RAY EXAM CHEST 1 VIEW: CPT

## 2021-09-19 PROCEDURE — 99283 EMERGENCY DEPT VISIT LOW MDM: CPT

## 2021-09-19 PROCEDURE — 93005 ELECTROCARDIOGRAM TRACING: CPT | Performed by: EMERGENCY MEDICINE

## 2021-09-19 PROCEDURE — 302449 STATCHG TRIAGE ONLY (STATISTIC)

## 2021-09-19 NOTE — ED TRIAGE NOTES
"Pt c/o \"waves\" of having his balance being off and severe fatigue. Pt states falls asleep while driving and has slurred speech. States will last for about 2 hours and then will stop for a while and then come back. This has been going on for 2 days. Pt normally has head aches but they have been worse recently. Pt has been in contact with [employer], so unsure if that has affected him. Denies any recent illness  "

## 2021-09-19 NOTE — ED PROVIDER NOTES
ED Provider Note    CHIEF COMPLAINT  Chief Complaint   Patient presents with   • Fatigue   • Headache        HPI  Gilson Amin is a 41 y.o. male who presents to the ED with complaints of severe fatigue.  Patient does have a significant history of bipolar disorder ADHD anxiety.  He is also recovering drug addict from opiates.  The patient is taking Suboxone for this.  The patient sees Dr. Long psychiatrist for these conditions.  Patient states that he did have a relapse and did some fentanyl about a week ago but the last 5 days he has been having these episodes where he just falls asleep.  He states that will be sitting down and just completely falls asleep.  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  Patient has any fevers chills nausea vomiting and is here for evaluation of his weakness.    REVIEW OF SYSTEMS  See HPI for further details. All other systems are negative.     PAST MEDICAL HISTORY  Past Medical History:   Diagnosis Date   • Anxiety    • Psychiatric disorder     bipolar, ADHD and anxiety       FAMILY HISTORY  Family History   Problem Relation Age of Onset   • Cancer Father        SOCIAL HISTORY  Social History     Socioeconomic History   • Marital status: Single     Spouse name: Not on file   • Number of children: Not on file   • Years of education: Not on file   • Highest education level: Not on file   Occupational History   • Not on file   Tobacco Use   • Smoking status: Current Every Day Smoker     Packs/day: 0.50     Years: 10.00     Pack years: 5.00     Types: Cigarettes   • Smokeless tobacco: Never Used   Substance and Sexual Activity   • Alcohol use: No     Comment: occ   • Drug use: Yes     Types: Inhaled     Comment: cocaine, heroin IVDU, last used 1 month ago   • Sexual activity: Not on file   Other Topics Concern   •  "Not on file   Social History Narrative   • Not on file     Social Determinants of Health     Financial Resource Strain:    • Difficulty of Paying Living Expenses:    Food Insecurity:    • Worried About Running Out of Food in the Last Year:    • Ran Out of Food in the Last Year:    Transportation Needs:    • Lack of Transportation (Medical):    • Lack of Transportation (Non-Medical):    Physical Activity:    • Days of Exercise per Week:    • Minutes of Exercise per Session:    Stress:    • Feeling of Stress :    Social Connections:    • Frequency of Communication with Friends and Family:    • Frequency of Social Gatherings with Friends and Family:    • Attends Taoism Services:    • Active Member of Clubs or Organizations:    • Attends Club or Organization Meetings:    • Marital Status:    Intimate Partner Violence:    • Fear of Current or Ex-Partner:    • Emotionally Abused:    • Physically Abused:    • Sexually Abused:       PUJA Chung      SURGICAL HISTORY  Past Surgical History:   Procedure Laterality Date   • OTHER ORTHOPEDIC SURGERY      r hand   • OTHER ORTHOPEDIC SURGERY      bilateral ankle   • OTHER ORTHOPEDIC SURGERY      bilateral knee sx       CURRENT MEDICATIONS  Home Medications    **Home medications have not yet been reviewed for this encounter**       No current facility-administered medications on file prior to encounter.     Current Outpatient Medications on File Prior to Encounter   Medication Sig Dispense Refill   • naltrexone (DEPADE) 50 MG Tab      • busPIRone (BUSPAR) 10 MG Tab Take 1 Tab by mouth 2 times a day. 60 Tab 1   • mirtazapine (REMERON) 30 MG Tab tablet Take 1 Tab by mouth every bedtime. 30 Tab 1         ALLERGIES  No Known Allergies    PHYSICAL EXAM  VITAL SIGNS: /86   Pulse 86   Temp 36.3 °C (97.3 °F) (Temporal)   Resp 16   Ht 1.778 m (5' 10\")   Wt 71.3 kg (157 lb 3 oz)   SpO2 95%   BMI 22.55 kg/m²    Pulse Oximetry was obtained. It showed a reading of " Pulse Oximetry: 95 %.  I interpreted this as nonhypoxic.     Constitutional: Well developed, Well nourished, No acute distress, Non-toxic appearance.   HENT: Normocephalic, Atraumatic, Bilateral external ears normal, bilateral tympanic membranes normal, Oropharynx moist mucous membranes, No oral exudates, Nose normal.   Eyes:  conjunctiva is normal, there are no signs of exudate.  Nipples are equal round reactive to light extraocular motions are intact there is no nystagmus  Neck: Supple, no cervical lymphadenopathy, no meningeal signs..   Lymphatic: No lymphadenopathy noted.   Cardiovascular: Regular rate and rhythm without murmurs gallops or rubs.   Thorax & Lungs: Mild expiratory wheezes with rhonchi throughout.  Able to clear with some coughing.  Abdomen: Soft, nontender nondistended. Bowel sounds are present.   Skin: Warm, Dry, No erythema,   Back: No tenderness, No CVA tenderness.  Musculoskeletal: Good range of motion in all major joints. No tenderness to palpation or major deformities noted. Intact distal pulses, no clubbing, no cyanosis, no edema     Neurologic: Alert & oriented x 3, Normal motor function, Normal sensory function, No focal deficits noted.  Renal nerves II through XII are grossly intact DTRs are 2+ and equal throughout.  Again the patient has no ataxia is able to ambulate without assistance.  Laying down and not stimulated the patient will fall asleep easily awakens  Psychiatric: Affect normal, Judgment normal, Mood normal.     EKG  Below by myself    RADIOLOGY/PROCEDURES  DX-CHEST-PORTABLE (1 VIEW)   Final Result         1. No acute cardiopulmonary abnormalities are identified.          Results for orders placed or performed during the hospital encounter of 09/19/21   EKG   Result Value Ref Range    Report       [facility] Emergency Dept.    Test Date:  2021-09-19  Pt Name:    SALVADOR STEWART                Department: [facility]  MRN:        [id number]                       [facility]  Gender:     Male                         Technician: [id number]  :        1980                   Requested By:JIMMY SERRATO  Order #:    [id number]                    Reading MD: JIMMY SERRATO MD    Measurements  Intervals                                Axis  Rate:       74                           P:          56  IN:         134                          QRS:        44  QRSD:       104                          T:          49  QT:         401  QTc:        445    Interpretive Statements  Sinus rhythm  No previous ECG available for comparison  Normal ECG  Electronically Signed On 2021 21:43:32 PDT by JIMMY SERRATO MD           COURSE & MEDICAL DECISION MAKING  Pertinent Labs & Imaging studies reviewed. (See chart for details)  Patient presents emerge department for evaluation he is hypersomnolent on examination but has no signs of ataxia and no other neurologic findings.  I did order for urine drug screen as well as CBC, CMP EKG and chest x-ray chest x-ray and EKG were obtained with the above findings EKG is normal.  Patient concerned he might be having some heavy metal poisoning but he has no neurologic findings consistent with heavy metal poisoning he is just primarily hypersomnolent.  The patient is on Suboxone and did use fentanyl about a week ago.  I did recommend that we give him a small dose of Narcan to see if that woke him up the patient adamantly refused to have this done.  At this point I have ordered for those laboratory studies but after several blood sticks nursing staff was unable to do so the patient did not want to stay for any further evaluation per the nursing note.  I did not have a chance to discuss with the patient about my above findings .    FINAL IMPRESSION  1.  Hypersomnolence  2.  Left without further evaluation/AMA          Electronically signed by: Jimmy Serrato M.D., 2021 3:25 PM

## 2021-09-20 NOTE — ED NOTES
Unsuccessful attempts to do a blood draw,  present.  Pt states he doesn't want any more done.  States he wants to leave AMA.  Pt left AMA, refused to sign form.  Pt ambulated out self without assist .  Pt aware to return for any changes or concerns

## 2022-06-24 NOTE — ED AVS SNAPSHOT
[employer] Access Code: [email or web address]  Expires: 4/18/2017 11:39 PM    [employer]  A secure, online tool to manage your health information     [employer]® is a secure, online tool that connects you to your personalized health information from the privacy of your home -- day or night - making it very easy for you to manage your healthcare. Once the activation process is completed, you can even access your medical information using the Re.[employer] marvin, which is available for free in the Apple Marvin store or Google Play store.     Re[employer]Mu provides the following levels of access (as shown below):   My Chart Features   [facility] Primary Care Doctor [facility]  Specialists [facility]  Urgent  Care Non-[facility]  Primary Care  Doctor   Email your healthcare team securely and privately 24/7 X X X X   Manage appointments: schedule your next appointment; view details of past/upcoming appointments X      Request prescription refills. X      View recent personal medical records, including lab and immunizations X X X X   View health record, including health history, allergies, medications X X X X   Read reports about your outpatient visits, procedures, consult and ER notes X X X X   See your discharge summary, which is a recap of your hospital and/or ER visit that includes your diagnosis, lab results, and care plan. X X       How to register for [employer]:  1. Go to  [email or web address].  2. Click on the Sign Up Now box, which takes you to the New Member Sign Up page. You will need to provide the following information:  a. Enter your [employer] Access Code exactly as it appears at the top of this page. (You will not need to use this code after you’ve completed the sign-up process. If you do not sign up before the expiration date, you must request a new code.)   b. Enter your date of birth.   c. Enter your home email address.   d. Click Submit, and follow the next screen’s instructions.  3. Create a [employer]Mu ID. This will be your Re[employer]  login ID and cannot be changed, so think of one that is secure and easy to remember.  4. Create a [employer] password. You can change your password at any time.  5. Enter your Password Reset Question and Answer. This can be used at a later time if you forget your password.   6. Enter your e-mail address. This allows you to receive e-mail notifications when new information is available in [employer].  7. Click Sign Up. You can now view your health information.    For assistance activating your [employer] account, call [phone]         Arava Counseling:  Patient counseled regarding adverse effects of Arava including but not limited to nausea, vomiting, abnormalities in liver function tests. Patients may develop mouth sores, rash, diarrhea, and abnormalities in blood counts. The patient understands that monitoring is required including LFTs and blood counts.  There is a rare possibility of scarring of the liver and lung problems that can occur when taking methotrexate. Persistent nausea, loss of appetite, pale stools, dark urine, cough, and shortness of breath should be reported immediately. Patient advised to discontinue Arava treatment and consult with a physician prior to attempting conception. The patient will have to undergo a treatment to eliminate Arava from the body prior to conception.